# Patient Record
(demographics unavailable — no encounter records)

---

## 2024-12-05 NOTE — DISCUSSION/SUMMARY
[de-identified] : A discussion regarding available pain management treatment options occurred with the patient. These included interventional, rehabilitative, pharmacological, and alternative modalities. We will proceed with the following:     Imagin. MRI lumbar spine w/o contrast to evaluate for anatomic changes of the lumbar discs, nerves, and surrounding tissue that will help provide information to accurately diagnose the patient's cause of pain and therefore treat said pain generator in the most effective way possible - whether that be specific physical therapy recommendations, medications, and/or interventional therapies.   Complementary treatment options: - lifestyle modifications discussed - avoid bending and bend with knees - avoid heavy lifting   Medications: 1. Ordered Gabapentin 300 mg q8h 2. Ordered Ibuprofen 800 mg q12h prn.   I advised Mrs. Bhandari that the NSAID should be taken with food.  In addition while taking the prescribed NSAID, no over the counter or other NSAIDs should be used, such as ibuprofen (Motrin or Advil) or naproxen (Aleve) as this can cause stomach upset or other side effects.  If needed for fever or breakthrough pain Tylenol can be used.  We are going to start gabapentin. Potential side effects were discussed which included dizziness, sedation, and pedal edema to name a few. If the patient cannot tolerate these side effects should they occur, then they will stop the medication. If the patient experiences sedation, they understand that they should not drive. The usage of the medication was discussed and the patient understands that it is an anti-epileptic medication used for neuropathic pain and that the pain relief from this medication will likely be subtle, and that hopefully in combination with the other treatments we are offering we will be able to get some additive relief.   - Follow up in 2-3 weeks to discuss imaging.   I Arline Cox attest that this documentation has been prepared under the direction and in the presence of provider Dr. Barber Smith.  The documentation recorded by the scribe in my presence, accurately reflects the service I personally performed, and the decisions made by me with my edits as appropriate.   Barber Smith, DO

## 2024-12-05 NOTE — HISTORY OF PRESENT ILLNESS
[FreeTextEntry1] : Mrs. Bhandari is a 52-year-old female presenting to establish care for her lower back pain. She is accompanied by her  today. She has had lower back pain for many years. She denies any inciting event or any known injury/trauma that brought on this severe flare up. She has pain, which is in the bilateral lower lumbar spine, below the waistband area, with referred pain into the buttock. She has pain when transitioning from a seated to standing position, standing/walking. She has no pain when sitting. She describes the pain as stiffness, throbbing, pressure-like along with sharp, stabbing pains. She denies any radicular pain or numbness/tingling. She denies any weakness. She rates the pain at a 8/10 on the pain scale. She was recently seen by her PCP and was prescribed Meloxicam 15 mg qD. The pain is present daily and has been affecting her quality of life. She denies any bowel/bladder incontinence, fevers/chills, recent weight loss or any saddle anesthesia.

## 2024-12-05 NOTE — DATA REVIEWED
[FreeTextEntry1] : X-ray of the lumbar spine taken on 12-5-2024 showed 5 nonrib-bearing lumbar-type vertebral bodies which demonstrate mild levoscoliosis, with the apex being at L3. Vertebral bodies demonstrate normal alignment. The vertebral body heights preserved without acute fracture. There are mild bilateral degenerative changes, most prominent at L3-4, L5-S1, where there is intervertebral disc space narrowing and endplate osteophyte formation. Bilateral facet arthrosis at L3-4 and L4-5. The soft tissues are within normal limits. Degenerative sclerosis along the SI joints.

## 2024-12-05 NOTE — PHYSICAL EXAM
Please read the healthy family handout that you were given and share it with your family. Please compare this printed medication list with your medications at home to be sure they are the same. If you have any medications that are different please contact us immediately at [phone]. Also review your allergies that we have listed, these may also include medications that you have not been able to tolerate, make sure everything listed is correct. If you have any allergies that are different please contact us immediately at [phone].
[de-identified] : Lumbar Spine Exam: Inspection: erythema (-) ecchymosis (-) rashes (-) alignment: no scoliosis  Palpation: Midline lumbar tenderness:           L (-)    (-) paraspinal tenderness:                  L (-) ; R (-) sciatic nerve tenderness :             L (-) ; R (-) SI joint tenderness:                        L (-) ; R (-) GTB tenderness:                            L (-);  R (-)  Limited ROM 2/2 to pain with lumbar flexion and extension w/ rotation to R and L side  Strength: 5/5 throughout all muscle groups of the lower extremity                                     Right       Left    Hip Flexion:                (5/5)       (5/5) Quadriceps:               (5/5)       (5/5) Hamstrings:                (5/5)       (5/5) Ankle Dorsiflexion:     (5/5)       (5/5) EHL:                           (5/5)       (5/5) Ankle Plantarflexion:  (5/5)       (5/5)  Special Tests: SLR:                           R (-) ; L (-) Facet loading:            R (-) ; L (-) ELIEZER test:               R (-) ; L (-)  Neurologic: Light touch intact throughout LE  Reflexes normal and symmetric   Gait: mildly antalgic gait ambulates w/o assistive device 
[de-identified] : Lumbar Spine Exam: Inspection: erythema (-) ecchymosis (-) rashes (-) alignment: no scoliosis  Palpation: Midline lumbar tenderness:           L (-)    (-) paraspinal tenderness:                  L (-) ; R (-) sciatic nerve tenderness :             L (-) ; R (-) SI joint tenderness:                        L (-) ; R (-) GTB tenderness:                            L (-);  R (-)  Limited ROM 2/2 to pain with lumbar flexion and extension w/ rotation to R and L side  Strength: 5/5 throughout all muscle groups of the lower extremity                                     Right       Left    Hip Flexion:                (5/5)       (5/5) Quadriceps:               (5/5)       (5/5) Hamstrings:                (5/5)       (5/5) Ankle Dorsiflexion:     (5/5)       (5/5) EHL:                           (5/5)       (5/5) Ankle Plantarflexion:  (5/5)       (5/5)  Special Tests: SLR:                           R (-) ; L (-) Facet loading:            R (-) ; L (-) ELIEZER test:               R (-) ; L (-)  Neurologic: Light touch intact throughout LE  Reflexes normal and symmetric   Gait: mildly antalgic gait ambulates w/o assistive device

## 2024-12-05 NOTE — DISCUSSION/SUMMARY
[de-identified] : A discussion regarding available pain management treatment options occurred with the patient. These included interventional, rehabilitative, pharmacological, and alternative modalities. We will proceed with the following:     Imagin. MRI lumbar spine w/o contrast to evaluate for anatomic changes of the lumbar discs, nerves, and surrounding tissue that will help provide information to accurately diagnose the patient's cause of pain and therefore treat said pain generator in the most effective way possible - whether that be specific physical therapy recommendations, medications, and/or interventional therapies.   Complementary treatment options: - lifestyle modifications discussed - avoid bending and bend with knees - avoid heavy lifting   Medications: 1. Ordered Gabapentin 300 mg q8h 2. Ordered Ibuprofen 800 mg q12h prn.   I advised Mrs. Bhandari that the NSAID should be taken with food.  In addition while taking the prescribed NSAID, no over the counter or other NSAIDs should be used, such as ibuprofen (Motrin or Advil) or naproxen (Aleve) as this can cause stomach upset or other side effects.  If needed for fever or breakthrough pain Tylenol can be used.  We are going to start gabapentin. Potential side effects were discussed which included dizziness, sedation, and pedal edema to name a few. If the patient cannot tolerate these side effects should they occur, then they will stop the medication. If the patient experiences sedation, they understand that they should not drive. The usage of the medication was discussed and the patient understands that it is an anti-epileptic medication used for neuropathic pain and that the pain relief from this medication will likely be subtle, and that hopefully in combination with the other treatments we are offering we will be able to get some additive relief.   - Follow up in 2-3 weeks to discuss imaging.   I Arline Cox attest that this documentation has been prepared under the direction and in the presence of provider Dr. Barber Smith.  The documentation recorded by the scribe in my presence, accurately reflects the service I personally performed, and the decisions made by me with my edits as appropriate.   Barber Smith, DO

## 2024-12-20 NOTE — DISCUSSION/SUMMARY
[de-identified] : A discussion regarding available pain management treatment options occurred with the patient. These included interventional, rehabilitative, pharmacological, and alternative modalities. We will proceed with the following:   Interventional treatment options: 1. Proceed with a Bilateral L3, L4, L5 medial branch facet block with sedation (Left side is more bothersome) Treatment options were discussed with the patient. The patient has been having persistent axial low back pain that has minimally improved with conservative therapy.    The patient was given the option to proceed with a lumbar medial branch block, which would be potentially both diagnostic and therapeutic. If the patient has a positive response to the local anesthetic, With two positive responses we can proceed with a radiofrequency ablation of the lumbar medial branch nerves for potential long term pain relief. If the patient does not get relief we can consider other options.    The risks and benefits were discussed which included bleeding, infection, nerve injury, no pain relief or worse, increased pain, intrathecal injection, and the side effects of steroids.  All questions were answered to the patient's satisfaction.  The patient has severe anxiety of procedures that necessitates monitored anesthesia care (MAC). The procedure performed will be close to major nerves, arteries, and spinal cord and/or joint structures. Due to the proximity of these structures, we need the patient to be still during the procedure.  With the help of MAC, this will be safely achieved and decrease the risk of any complications.   Complementary treatment options: - lifestyle modifications discussed - avoid bending and bend with knees - avoid heavy lifting   Medications: - continue with Ibuprofen 800 mg q12h prn.   I advised Mrs. Bhandari that the NSAID should be taken with food.  In addition while taking the prescribed NSAID, no over the counter or other NSAIDs should be used, such as ibuprofen (Motrin or Advil) or naproxen (Aleve) as this can cause stomach upset or other side effects.  If needed for fever or breakthrough pain Tylenol can be used.  - Follow up 2 weeks post injection.   I Arline Cox attest that this documentation has been prepared under the direction and in the presence of provider Dr. Barber Smith.  The documentation recorded by the scribe in my presence, accurately reflects the service I personally performed, and the decisions made by me with my edits as appropriate.   Barber Smith, DO

## 2024-12-20 NOTE — PHYSICAL EXAM
[de-identified] : L spine   No rashes, erythema, edema noted TTP b/l lumbar paraspinal muscles Positive facet loading bilaterally Positive KEMPS test bilaterally LLE: 5/5 strength RLE: 5/5 strength Sensation intact b/l LE

## 2024-12-20 NOTE — PHYSICAL EXAM
[de-identified] : L spine   No rashes, erythema, edema noted TTP b/l lumbar paraspinal muscles Positive facet loading bilaterally Positive KEMPS test bilaterally LLE: 5/5 strength RLE: 5/5 strength Sensation intact b/l LE

## 2024-12-20 NOTE — HISTORY OF PRESENT ILLNESS
[FreeTextEntry1] : Mrs. Bhandari is a 52-year-old female presenting to establish care for her lower back pain. She is accompanied by her  today. She has had lower back pain for many years. She denies any inciting event or any known injury/trauma that brought on this severe flare up. She has pain, which is in the bilateral lower lumbar spine, below the waistband area, with referred pain into the buttock. She has pain when transitioning from a seated to standing position, standing/walking. She has no pain when sitting. She describes the pain as stiffness, throbbing, pressure-like along with sharp, stabbing pains. She denies any radicular pain or numbness/tingling. She denies any weakness. She rates the pain at a 8/10 on the pain scale. She was recently seen by her PCP and was prescribed Meloxicam 15 mg qD. The pain is present daily and has been affecting her quality of life. She denies any bowel/bladder incontinence, fevers/chills, recent weight loss or any saddle anesthesia.   12-: Revisit encounter. She is accompanied by her  today.   She is presenting today with persistent lower back pain. The pain is mainly on the left side of the lower back as opposed to the right. The pain is around the waistband area. She does note some pain into the buttocks however she denies any true radicular component. She describes the pain as sharp, shooting, stabbing, stiffness, throbbing and pressure like. She has no pain when at rest or sitting. Her pain is made worse when transitioning from a seated to standing position or when standing/walking for prolonged periods of time. She rates the pain at a 8/10 on the pain scale. Her pain is present daily and affects her quality of life on some days. She has been managing her pain with Gabapentin 300 mg q8h and Ibuprofen 800 mg q12h. She feels like the Gabapentin 300 mg is a little too strong for her and she has been getting very drowsy on this medication. She has been taking the Ibuprofen q12h standing. She has not yet returned back to work.

## 2024-12-20 NOTE — DATA REVIEWED
[FreeTextEntry1] : X-ray of the lumbar spine taken on 12-5-2024 showed 5 nonrib-bearing lumbar-type vertebral bodies which demonstrate mild levoscoliosis, with the apex being at L3. Vertebral bodies demonstrate normal alignment. The vertebral body heights preserved without acute fracture. There are mild bilateral degenerative changes, most prominent at L3-4, L5-S1, where there is intervertebral disc space narrowing and endplate osteophyte formation. Bilateral facet arthrosis at L3-4 and L4-5. The soft tissues are within normal limits. Degenerative sclerosis along the SI joints.   MRI of the lumbar spine taken @ [facility] on 12- showed annular disc bulging L2-3 with bilateral facet hypertrophy. Subtle anterolisthesis of L3 on L4 with annular dis-c bulging. There is impression on the thecal sac ad encroachment of bilateral neural foramina. Shallow left foraminal disc herniation with more diffuse disc bulging L4-5 with bilateral facet hypertrophy mild central spinal stenosis. Annular disc bulging at L5-S1.

## 2024-12-20 NOTE — DISCUSSION/SUMMARY
[de-identified] : A discussion regarding available pain management treatment options occurred with the patient. These included interventional, rehabilitative, pharmacological, and alternative modalities. We will proceed with the following:   Interventional treatment options: 1. Proceed with a Bilateral L3, L4, L5 medial branch facet block with sedation (Left side is more bothersome) Treatment options were discussed with the patient. The patient has been having persistent axial low back pain that has minimally improved with conservative therapy.    The patient was given the option to proceed with a lumbar medial branch block, which would be potentially both diagnostic and therapeutic. If the patient has a positive response to the local anesthetic, With two positive responses we can proceed with a radiofrequency ablation of the lumbar medial branch nerves for potential long term pain relief. If the patient does not get relief we can consider other options.    The risks and benefits were discussed which included bleeding, infection, nerve injury, no pain relief or worse, increased pain, intrathecal injection, and the side effects of steroids.  All questions were answered to the patient's satisfaction.  The patient has severe anxiety of procedures that necessitates monitored anesthesia care (MAC). The procedure performed will be close to major nerves, arteries, and spinal cord and/or joint structures. Due to the proximity of these structures, we need the patient to be still during the procedure.  With the help of MAC, this will be safely achieved and decrease the risk of any complications.   Complementary treatment options: - lifestyle modifications discussed - avoid bending and bend with knees - avoid heavy lifting   Medications: - continue with Ibuprofen 800 mg q12h prn.   I advised Mrs. Bhandari that the NSAID should be taken with food.  In addition while taking the prescribed NSAID, no over the counter or other NSAIDs should be used, such as ibuprofen (Motrin or Advil) or naproxen (Aleve) as this can cause stomach upset or other side effects.  If needed for fever or breakthrough pain Tylenol can be used.  - Follow up 2 weeks post injection.   I Arline Cox attest that this documentation has been prepared under the direction and in the presence of provider Dr. Barber Smith.  The documentation recorded by the scribe in my presence, accurately reflects the service I personally performed, and the decisions made by me with my edits as appropriate.   Barber Smith, DO

## 2024-12-30 NOTE — PROCEDURE
[FreeTextEntry3] : Date of Service: 12/30/2024   Account: [id number]  Patient: LITO VINCENT   YOB: 1972  Age: 52 year  Surgeon:                  Barber Smith DO  Assistant:                None  Pre-Operative Diagnosis:   Lumbar Spondylosis      Post Operative Diagnosis:  Lumbar Spondylosis  Procedure:             Bilateral  (L3, L4, L5 medial branch) L4-5, L5-S1 facet joint block under fluoroscopic guidance.  Anesthesia:            none  This procedure was carried out using fluoroscopic guidance.  The risks and benefits of the procedure were discussed extensively with the patient.  The consent of the patient was obtained and the following procedure was performed. The patient was placed in the prone position on the fluoroscopy table and the area was prepped and draped in a sterile fashion.  A timeout was performed with all essential staff present and the site and side were verified.  The lumbar vertebral bodies were identified and the fluoroscope was obliqued ipsilateral to approximately 30 degrees to reveal the appropriate anatomical view.  The junction of the superior articulate process and transverse process at the right L4, and L5 levels were identified and marked. A spinal needle was then introduced and advanced to the above target points at the junction of the SAP and transverse processes until bone was contacted.  Fluoroscope then focused on the right sacral ala on A/P view, and marked at this point.  A spinal needle was then advanced under fluoroscopic guidance until bone was contacted at the ala.    After negative aspiration for heme and CSF, an 1 cc of a mixture of 0.5% Marcaine and 10mg decadron was injected at each of the injection sites.  The procedure was performed in the exact same fashion on the contralateral left side at the L4, L5 and sacral ala levels.  The needles were subsequently removed.  Vital signs remained normal.  Pulse oximeter was used throughout the procedure and the patient's pulse and oxygen saturation remained within normal limits.  The patient tolerated the procedure well.  There were no complications.  The patient was instructed to apply ice over the injection sites for twenty minutes every two hours for the next 24 to 48 hours.  Disposition:       1. The patient was advised to F/U in 1-2 weeks to assess the response to the injection.       2. They were advised to keep a pain diary to report the results of the diagnostic block at their [facility].      3. The patient was also instructed to contact me immediately if there were any concerns related to the procedure performed.

## 2025-01-08 NOTE — PHYSICAL EXAM
[de-identified] : L spine   No rashes, erythema, edema noted TTP b/l lumbar paraspinal muscles + Seated slump test bilaterally  LLE: 5/5 strength RLE: 5/5 strength Sensation intact b/l LE

## 2025-01-08 NOTE — HISTORY OF PRESENT ILLNESS
[FreeTextEntry1] : Mrs. Bhandari is a 52-year-old female presenting to establish care for her lower back pain. She is accompanied by her  today. She has had lower back pain for many years. She denies any inciting event or any known injury/trauma that brought on this severe flare up. She has pain, which is in the bilateral lower lumbar spine, below the waistband area, with referred pain into the buttock. She has pain when transitioning from a seated to standing position, standing/walking. She has no pain when sitting. She describes the pain as stiffness, throbbing, pressure-like along with sharp, stabbing pains. She denies any radicular pain or numbness/tingling. She denies any weakness. She rates the pain at a 8/10 on the pain scale. She was recently seen by her PCP and was prescribed Meloxicam 15 mg qD. The pain is present daily and has been affecting her quality of life. She denies any bowel/bladder incontinence, fevers/chills, recent weight loss or any saddle anesthesia.   12-: Revisit encounter. She is accompanied by her  today.   She is presenting today with persistent lower back pain. The pain is mainly on the left side of the lower back as opposed to the right. The pain is around the waistband area. She does note some pain into the buttocks however she denies any true radicular component. She describes the pain as sharp, shooting, stabbing, stiffness, throbbing and pressure like. She has no pain when at rest or sitting. Her pain is made worse when transitioning from a seated to standing position or when standing/walking for prolonged periods of time. She rates the pain at a 8/10 on the pain scale. Her pain is present daily and affects her quality of life on some days. She has been managing her pain with Gabapentin 300 mg q8h and Ibuprofen 800 mg q12h. She feels like the Gabapentin 300 mg is a little too strong for her and she has been getting very drowsy on this medication. She has been taking the Ibuprofen q12h standing. She has not yet returned back to work.   TODAY (1/8/25) Since her last office visit, she underwent a Bilateral L3, L4, L5 medial branch facet block on 12- which gave her 50% relief. Patient continues to have pain across her lower back worse on the left side than the right side.  Pain occasionally radiates into her left buttock area.  Patient states that the pain is worse when she leans forward, sleeps at night, sits down.  Pain sometimes relieved when she stands up.  Pain is occasionally associated with numbness and tingling.  She takes ibuprofen 800 mg as needed as needed which gives her moderate relief.  Patient was previously on gabapentin 300 mg however she states that she feels it makes her drowsy therefore she has discontinued it.

## 2025-01-08 NOTE — DISCUSSION/SUMMARY
[de-identified] : A discussion regarding available pain management treatment options occurred with the patient. These included interventional, rehabilitative, pharmacological, and alternative modalities. We will proceed with the following:   Interventional treatment options: 1. Proceed with L4-5  LESI with MAC - Treatment options were discussed with the patient. The patient has been having persistent lower back and lumbar radicular pain with minimal improvement with conservative therapies. Given that the patient has severe pain and failed conservative treatment, the patient was given the option to proceed with a lumbar epidural steroid injection to try to get some pain relief. - The risks and benefits were discussed which included bleeding, infection, nerve injury, no pain relief or worse, increased pain. All questions were answered and concerns addressed.  The patient has severe anxiety of procedures that necessitates monitored anesthesia care (MAC). The procedure performed will be close to major nerves, arteries, and spinal cord and/or joint structures. Due to the proximity of these structures, we need the patient to be still during the procedure.  With the help of MAC, this will be safely achieved and decrease the risk of any complications.   Complementary treatment options: - lifestyle modifications discussed - avoid bending and bend with knees - avoid heavy lifting   Medications: - continue with Ibuprofen 800 mg q12h prn.  I advised Mrs. Bhandari that the NSAID should be taken with food.  In addition while taking the prescribed NSAID, no over the counter or other NSAIDs should be used, such as ibuprofen (Motrin or Advil) or naproxen (Aleve) as this can cause stomach upset or other side effects.  If needed for fever or breakthrough pain Tylenol can be used.  - Follow up 2 weeks post injection.   CONNIE Chaudhary DO

## 2025-01-27 NOTE — PROCEDURE
[FreeTextEntry3] : Date of Service: 01/27/2025   Account: [id number]   Patient: LITO VINCENT   YOB: 1972   Age: 52 year   Surgeon:      Barber Smith DO   Assistant:    None   Pre-Operative Diagnosis:         Lumbar radiculopathy M54.16   Post Operative Diagnosis:       Lumbar radiculopathy M54.16   Procedure:             Lumbar interlaminar (L4-L5) epidural steroid injection under fluoroscopic guidance   Anesthesia:            none   This procedure was carried out using fluoroscopic guidance.  The risks and benefits of the procedure were discussed extensively with the patient.  The consent of the patient was obtained, and the following procedure was performed. The patient was placed in the prone position on the fluoroscopy table and the area was prepped and draped in a sterile fashion.  A timeout was performed with all essential staff present and the site and side were verified.   The patient was placed in the prone position with a pillow under the abdomen to minimize the lumbar lordosis.  The lumbar area was prepped and draped in a sterile fashion.  Under A/P view with slight cephalad-caudad angulation, the L4-L5 interspace was identified and marked.  Using sterile technique, the superficial skin was anesthetized with 1% Lidocaine.  A 20 gauge Tuohy needle was advanced into the epidural space under fluoroscopy using loss of resistance at the L4-L5 level.  After negative aspiration for heme or CSF, an epidurogram was obtained in the A/P and lateral fluoroscopic views using 2-3 cc of Omnipaque contrast confirming epidural placement of the needle.  After this, 5 cc of of a mixture of preservative free normal saline and 10mg decadron were injected into the epidural space.   The needle was subsequently removed.  Vital signs remained normal.  Pulse oximeter was used throughout the procedure and the patient's pulse and oxygen saturation remained within normal limits.  The patient tolerated the procedure well.  There were no complications.  The patient was instructed to apply ice over the injection sites for twenty minutes every two hours for the next 24 to 48 hours.   Disposition:        1. The patient was advised to F/U in 1-2 weeks to assess the response to the injection.      2. The patient was also instructed to contact me immediately if there were any concerns related to the procedure performed.

## 2025-02-19 NOTE — PHYSICAL EXAM
[de-identified] : L spine   No rashes, erythema, edema noted TTP b/l lumbar paraspinal muscles + Seated slump test bilaterally  LLE: 5/5 strength RLE: 5/5 strength Sensation intact b/l LE

## 2025-02-19 NOTE — DATA REVIEWED
[FreeTextEntry1] : X-ray of the lumbar spine taken on 12-5-2024 showed 5 nonrib-bearing lumbar-type vertebral bodies which demonstrate mild levoscoliosis, with the apex being at L3. Vertebral bodies demonstrate normal alignment. The vertebral body heights preserved without acute fracture. There are mild bilateral degenerative changes, most prominent at L3-4, L5-S1, where there is intervertebral disc space narrowing and endplate osteophyte formation. Bilateral facet arthrosis at L3-4 and L4-5. The soft tissues are within normal limits. Degenerative sclerosis along the SI joints.   MRI of the lumbar spine taken @ [facility] on 12- showed annular disc bulging L2-3 with bilateral facet hypertrophy. Subtle anterolisthesis of L3 on L4 with annular dis-c bulging. There is impression on the thecal sac ad encroachment of bilateral neural foramina. Shallow left foraminal disc herniation with more diffuse disc bulging L4-5 with bilateral facet hypertrophy mild central spinal stenosis. Annular disc bulging at L5-S1.  87

## 2025-02-19 NOTE — HISTORY OF PRESENT ILLNESS
[FreeTextEntry1] : Mrs. Bhandari is a 52-year-old female presenting to establish care for her lower back pain. She is accompanied by her  today. She has had lower back pain for many years. She denies any inciting event or any known injury/trauma that brought on this severe flare up. She has pain, which is in the bilateral lower lumbar spine, below the waistband area, with referred pain into the buttock. She has pain when transitioning from a seated to standing position, standing/walking. She has no pain when sitting. She describes the pain as stiffness, throbbing, pressure-like along with sharp, stabbing pains. She denies any radicular pain or numbness/tingling. She denies any weakness. She rates the pain at a 8/10 on the pain scale. She was recently seen by her PCP and was prescribed Meloxicam 15 mg qD. The pain is present daily and has been affecting her quality of life. She denies any bowel/bladder incontinence, fevers/chills, recent weight loss or any saddle anesthesia.   12-: Revisit encounter. She is accompanied by her  today.   She is presenting today with persistent lower back pain. The pain is mainly on the left side of the lower back as opposed to the right. The pain is around the waistband area. She does note some pain into the buttocks however she denies any true radicular component. She describes the pain as sharp, shooting, stabbing, stiffness, throbbing and pressure like. She has no pain when at rest or sitting. Her pain is made worse when transitioning from a seated to standing position or when standing/walking for prolonged periods of time. She rates the pain at a 8/10 on the pain scale. Her pain is present daily and affects her quality of life on some days. She has been managing her pain with Gabapentin 300 mg q8h and Ibuprofen 800 mg q12h. She feels like the Gabapentin 300 mg is a little too strong for her and she has been getting very drowsy on this medication. She has been taking the Ibuprofen q12h standing. She has not yet returned back to work.   TODAY (1/8/25) Since her last office visit, she underwent a Bilateral L3, L4, L5 medial branch facet block on 12- which gave her 50% relief for 1 day.  Patient continues to have pain across her lower back worse on the left side than the right side.  Pain occasionally radiates into her left buttock area.  Patient states that the pain is worse when she leans forward, sleeps at night, sits down.  Pain sometimes relieved when she stands up.  Pain is occasionally associated with numbness and tingling.  She takes ibuprofen 800 mg as needed as needed which gives her moderate relief.  Patient was previously on gabapentin 300 mg however she states that she feels it makes her drowsy therefore she has discontinued it.  TODAY (2/19/25)  Pt is here today for a follow up, she is s/p L4-5 LESI which she states gave her great relief (60-70%) to date, it helped her more than the block.  She continues to have intermittent right sided back pain that is mostly axial in nature worse when she leans forward and when she sleeps or sits down.  Pain is relieved when she stands and walks around. She takes ibuprofen 800 mg as needed as needed which gives her moderate relief.  Patient was previously on gabapentin 300 mg however she states that she feels it makes her drowsy therefore she has discontinued it. Her pain is 2/2 right-sided L4-5 disc herniation with no neurogenic claudication. She would like to repeat the injection since she still has some lingering pain.

## 2025-02-19 NOTE — DISCUSSION/SUMMARY
[de-identified] : A discussion regarding available pain management treatment options occurred with the patient. These included interventional, rehabilitative, pharmacological, and alternative modalities. We will proceed with the following:   Interventional treatment options: 1. Proceed with a repeat L4-5  LESI  Treatment options were discussed. The patient has been having persistent, severe low back pain and lumbar radicular pain that has minimally improved with conservative therapy thus far (including physical therapy, home stretching and anti-inflammatory medications. The patient was given the option of proceeding with a lumbar epidural steroid injection to try and get the patient some pain relief. The risks and benefits were discussed, which included the associated problems with steroids, bleeding, nerve injury, lack of improvement with pain, and 0.5% chance of a post dural puncture headache.   - The risks and benefits were discussed which included bleeding, infection, nerve injury, no pain relief or worse, increased pain. All questions were answered and concerns addressed.  Complementary treatment options: - lifestyle modifications discussed - avoid bending and bend with knees - avoid heavy lifting   Medications: - continue with Ibuprofen 800 mg q12h prn.  I advised Mrs. Bhandari that the NSAID should be taken with food.  In addition while taking the prescribed NSAID, no over the counter or other NSAIDs should be used, such as ibuprofen (Motrin or Advil) or naproxen (Aleve) as this can cause stomach upset or other side effects.  If needed for fever or breakthrough pain Tylenol can be used.  - Follow up 2 weeks post injection.   CONNIE Chaudhary DO

## 2025-02-19 NOTE — PHYSICAL EXAM
[de-identified] : L spine   No rashes, erythema, edema noted TTP b/l lumbar paraspinal muscles + Seated slump test bilaterally  LLE: 5/5 strength RLE: 5/5 strength Sensation intact b/l LE

## 2025-02-19 NOTE — DISCUSSION/SUMMARY
[de-identified] : A discussion regarding available pain management treatment options occurred with the patient. These included interventional, rehabilitative, pharmacological, and alternative modalities. We will proceed with the following:   Interventional treatment options: 1. Proceed with a repeat L4-5  LESI  Treatment options were discussed. The patient has been having persistent, severe low back pain and lumbar radicular pain that has minimally improved with conservative therapy thus far (including physical therapy, home stretching and anti-inflammatory medications. The patient was given the option of proceeding with a lumbar epidural steroid injection to try and get the patient some pain relief. The risks and benefits were discussed, which included the associated problems with steroids, bleeding, nerve injury, lack of improvement with pain, and 0.5% chance of a post dural puncture headache.   - The risks and benefits were discussed which included bleeding, infection, nerve injury, no pain relief or worse, increased pain. All questions were answered and concerns addressed.  Complementary treatment options: - lifestyle modifications discussed - avoid bending and bend with knees - avoid heavy lifting   Medications: - continue with Ibuprofen 800 mg q12h prn.  I advised Mrs. Bhandari that the NSAID should be taken with food.  In addition while taking the prescribed NSAID, no over the counter or other NSAIDs should be used, such as ibuprofen (Motrin or Advil) or naproxen (Aleve) as this can cause stomach upset or other side effects.  If needed for fever or breakthrough pain Tylenol can be used.  - Follow up 2 weeks post injection.   CONNIE Chaudhary DO